# Patient Record
Sex: MALE | Race: BLACK OR AFRICAN AMERICAN | NOT HISPANIC OR LATINO | URBAN - METROPOLITAN AREA
[De-identification: names, ages, dates, MRNs, and addresses within clinical notes are randomized per-mention and may not be internally consistent; named-entity substitution may affect disease eponyms.]

---

## 2017-10-07 ENCOUNTER — EMERGENCY (EMERGENCY)
Facility: HOSPITAL | Age: 27
LOS: 1 days | Discharge: HOME | End: 2017-10-07
Attending: EMERGENCY MEDICINE | Admitting: EMERGENCY MEDICINE
Payer: MEDICAID

## 2017-10-07 VITALS
DIASTOLIC BLOOD PRESSURE: 82 MMHG | RESPIRATION RATE: 18 BRPM | OXYGEN SATURATION: 98 % | SYSTOLIC BLOOD PRESSURE: 138 MMHG | TEMPERATURE: 99 F | HEART RATE: 73 BPM

## 2017-10-07 DIAGNOSIS — J98.19 OTHER PULMONARY COLLAPSE: Chronic | ICD-10-CM

## 2017-10-07 PROCEDURE — 73610 X-RAY EXAM OF ANKLE: CPT | Mod: 26,LT

## 2017-10-07 PROCEDURE — 99284 EMERGENCY DEPT VISIT MOD MDM: CPT

## 2017-10-07 RX ORDER — CEPHALEXIN 500 MG
1 CAPSULE ORAL
Qty: 28 | Refills: 0 | OUTPATIENT
Start: 2017-10-07 | End: 2017-10-14

## 2017-10-07 NOTE — ED PROVIDER NOTE - PROGRESS NOTE DETAILS
SHAVONNE MCKEON:  Xray shows no foreign body.  Pt medically stable for discharge.  Pt to follow up with PMD or ED in 2-3 days for reassess.  Pt ambulatory without assistance.

## 2017-10-07 NOTE — ED PROVIDER NOTE - LOWER EXTREMITY EXAM, LEFT
healing abrasion over lateral malleolus, small bloody drainage, mild erythema around abrasion, swelling and TTP over lateral malleolus/SWELLING/TENDERNESS

## 2017-10-07 NOTE — ED PROVIDER NOTE - OBJECTIVE STATEMENT
25 y/o M with no significant PMHx of DM, HLD,, presents to the ED c/o left ankle pain. Pt fell appx one foot onto glass and scraped his foot 3 days ago. Pt states ankle is swollen, he believes he may have rolled his ankle when he fell. Pt endorses use of pain meds at home. Denies fevers, DC, pain in knee or hip, or any other complaints. NKDA. Allergy: shellfish.

## 2017-10-07 NOTE — ED PROVIDER NOTE - PMH
Asthma    Bleeding disorder  Unknown  Collapsed lung  right lung s/p surgical procedure  DM (diabetes mellitus)    HTN (hypertension)

## 2017-10-07 NOTE — ED PROVIDER NOTE - CARE PLAN
Principal Discharge DX:	Cellulitis of ankle  Instructions for follow-up, activity and diet:	Rest, drink plenty of fluids.  Advance activity as tolerated.  Continue all previously prescribed medications as directed.  Take Keflex 500 mg four times a day for 7 days -- finish this antibiotic. Keep extremity elevated and wrapped.  Apply cool compresses to affected area for 15 minutes, 3-4 times per day. Follow up with your primary care physician or ED in 48-72 hours- bring copies of your results.  Return to the ER for worsening or persistent symptoms, including but not limited to worsening redness, inability to walk, fevers, and/or ANY NEW OR CONCERNING SYMPTOMS. If you have issues obtaining follow up, please call: [phone] to obtain a doctor or specialist who takes your insurance in your area.

## 2017-10-07 NOTE — ED ADULT TRIAGE NOTE - CHIEF COMPLAINT QUOTE
Pt s/p fall 3 days ago, injured left ankle. Denies LOC during fall. c/o pain and swelling to left ankle. Hx of diabetes. FS is 251

## 2017-10-07 NOTE — ED PROVIDER NOTE - PLAN OF CARE
Rest, drink plenty of fluids.  Advance activity as tolerated.  Continue all previously prescribed medications as directed.  Take Keflex 500 mg four times a day for 7 days -- finish this antibiotic. Keep extremity elevated and wrapped.  Apply cool compresses to affected area for 15 minutes, 3-4 times per day. Follow up with your primary care physician or ED in 48-72 hours- bring copies of your results.  Return to the ER for worsening or persistent symptoms, including but not limited to worsening redness, inability to walk, fevers, and/or ANY NEW OR CONCERNING SYMPTOMS. If you have issues obtaining follow up, please call: [phone] to obtain a doctor or specialist who takes your insurance in your area.

## 2018-04-16 ENCOUNTER — EMERGENCY (EMERGENCY)
Facility: HOSPITAL | Age: 28
LOS: 1 days | Discharge: HOME | End: 2018-04-16
Admitting: EMERGENCY MEDICINE
Payer: MEDICAID

## 2018-04-16 VITALS
OXYGEN SATURATION: 100 % | DIASTOLIC BLOOD PRESSURE: 71 MMHG | RESPIRATION RATE: 16 BRPM | TEMPERATURE: 98 F | SYSTOLIC BLOOD PRESSURE: 142 MMHG | HEART RATE: 55 BPM

## 2018-04-16 DIAGNOSIS — J98.19 OTHER PULMONARY COLLAPSE: Chronic | ICD-10-CM

## 2018-04-16 PROCEDURE — 99284 EMERGENCY DEPT VISIT MOD MDM: CPT | Mod: 25
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
## 2018-04-17 PROCEDURE — 73610 X-RAY EXAM OF ANKLE: CPT | Mod: 26,RT

## 2018-04-17 PROCEDURE — 73562 X-RAY EXAM OF KNEE 3: CPT | Mod: 26,RT

## 2018-04-17 PROCEDURE — 73630 X-RAY EXAM OF FOOT: CPT | Mod: 26,RT

## 2018-04-17 RX ORDER — IBUPROFEN 200 MG
600 TABLET ORAL ONCE
Qty: 0 | Refills: 0 | Status: COMPLETED | OUTPATIENT
Start: 2018-04-17 | End: 2018-04-17

## 2018-04-17 RX ORDER — IPRATROPIUM/ALBUTEROL SULFATE 18-103MCG
3 AEROSOL WITH ADAPTER (GRAM) INHALATION ONCE
Qty: 0 | Refills: 0 | Status: COMPLETED | OUTPATIENT
Start: 2018-04-17 | End: 2018-04-17

## 2018-04-17 RX ADMIN — Medication 600 MILLIGRAM(S): at 02:35

## 2018-04-17 RX ADMIN — Medication 3 MILLILITER(S): at 02:35

## 2018-04-17 RX ADMIN — Medication 600 MILLIGRAM(S): at 01:44
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
## 2018-04-17 NOTE — ED PROVIDER NOTE - PROGRESS NOTE DETAILS
SHAVONNE Owens: received sign out from SHAVONNE Perez to follow up xrays and discharge home with bulky estrada dressing.  X-rays negative, bulky estrada dressing placed, discharge and results reviewed with patient.

## 2018-04-17 NOTE — ED PROVIDER NOTE - LOWER EXTREMITY EXAM, RIGHT
LIMITED ROM/TENDERNESS/R knee limited ROM secondary to pain. +Swelling, slight effusion. Point tenderness to the medial knee and over the patella. +2 DP pulses, <2s capillary refill. TTP and swelling to the lateral malleolus. Neurovascularly intact distally.

## 2018-04-17 NOTE — ED PROVIDER NOTE - CARE PLAN
Principal Discharge DX:	Knee pain  Assessment and plan of treatment:	Follow up with your Doctor in 1-2 days.  Follow up with Orthopedics in 1-2 days.(see attached list)  Rest. Ice to affected area 3-4 x a day.  Rush wrap to knee.  Use crutches to ambulate.  Take Ibuprofen 600mg orally every 8 hours as needed for pain take with food.  Return to the ER for any persistent/worsening or new symptoms weakness, numbness or any concerning symptoms.  Secondary Diagnosis:	Musculoskeletal pain

## 2018-04-17 NOTE — ED PROVIDER NOTE - PLAN OF CARE
Follow up with your Doctor in 1-2 days.  Follow up with Orthopedics in 1-2 days.(see attached list)  Rest. Ice to affected area 3-4 x a day.  Rush wrap to knee.  Use crutches to ambulate.  Take Ibuprofen 600mg orally every 8 hours as needed for pain take with food.  Return to the ER for any persistent/worsening or new symptoms weakness, numbness or any concerning symptoms.

## 2018-04-17 NOTE — ED PROVIDER NOTE - MEDICAL DECISION MAKING DETAILS
1. s/p injury. R/o fx. Will XR R knee and ankle.   2. Smoker, PMHx of asthma, diffuse wheezing on exam. Will give nebs and re-evaluate.

## 2018-05-18 ENCOUNTER — APPOINTMENT (OUTPATIENT)
Dept: ORTHOPEDIC SURGERY | Facility: CLINIC | Age: 28
End: 2018-05-18

## 2021-08-11 ENCOUNTER — EMERGENCY (EMERGENCY)
Facility: HOSPITAL | Age: 31
LOS: 1 days | Discharge: HOME | End: 2021-08-11
Attending: EMERGENCY MEDICINE | Admitting: EMERGENCY MEDICINE
Payer: MEDICAID

## 2021-08-11 ENCOUNTER — EMERGENCY (EMERGENCY)
Facility: HOSPITAL | Age: 31
LOS: 1 days | Discharge: HOME | End: 2021-08-11
Admitting: STUDENT IN AN ORGANIZED HEALTH CARE EDUCATION/TRAINING PROGRAM
Payer: MEDICAID

## 2021-08-11 VITALS
TEMPERATURE: 98 F | OXYGEN SATURATION: 97 % | HEART RATE: 51 BPM | RESPIRATION RATE: 20 BRPM | SYSTOLIC BLOOD PRESSURE: 148 MMHG | DIASTOLIC BLOOD PRESSURE: 94 MMHG

## 2021-08-11 VITALS
TEMPERATURE: 98 F | HEIGHT: 77 IN | RESPIRATION RATE: 16 BRPM | OXYGEN SATURATION: 97 % | HEART RATE: 77 BPM | SYSTOLIC BLOOD PRESSURE: 154 MMHG | DIASTOLIC BLOOD PRESSURE: 100 MMHG

## 2021-08-11 VITALS
HEART RATE: 60 BPM | SYSTOLIC BLOOD PRESSURE: 140 MMHG | DIASTOLIC BLOOD PRESSURE: 80 MMHG | HEIGHT: 77 IN | OXYGEN SATURATION: 100 % | RESPIRATION RATE: 16 BRPM | TEMPERATURE: 98 F

## 2021-08-11 DIAGNOSIS — J98.19 OTHER PULMONARY COLLAPSE: Chronic | ICD-10-CM

## 2021-08-11 DIAGNOSIS — N47.7 OTHER INFLAMMATORY DISEASES OF PREPUCE: ICD-10-CM

## 2021-08-11 PROBLEM — E11.9 TYPE 2 DIABETES MELLITUS WITHOUT COMPLICATIONS: Chronic | Status: ACTIVE | Noted: 2017-10-07

## 2021-08-11 PROCEDURE — 99282 EMERGENCY DEPT VISIT SF MDM: CPT

## 2021-08-11 PROCEDURE — 99284 EMERGENCY DEPT VISIT MOD MDM: CPT

## 2021-08-11 RX ORDER — LIDOCAINE 4 G/100G
15 CREAM TOPICAL ONCE
Refills: 0 | Status: DISCONTINUED | OUTPATIENT
Start: 2021-08-11 | End: 2021-08-11

## 2021-08-11 RX ORDER — OXYCODONE AND ACETAMINOPHEN 5; 325 MG/1; MG/1
1 TABLET ORAL ONCE
Refills: 0 | Status: DISCONTINUED | OUTPATIENT
Start: 2021-08-11 | End: 2021-08-11

## 2021-08-11 RX ORDER — LIDOCAINE 4 G/100G
1 CREAM TOPICAL
Qty: 30 | Refills: 0
Start: 2021-08-11 | End: 2021-08-17

## 2021-08-11 RX ORDER — LIDOCAINE HCL 20 MG/ML
15 VIAL (ML) INJECTION ONCE
Refills: 0 | Status: COMPLETED | OUTPATIENT
Start: 2021-08-11 | End: 2021-08-11

## 2021-08-11 RX ADMIN — Medication 15 MILLILITER(S): at 17:54

## 2021-08-11 RX ADMIN — Medication 1 APPLICATION(S): at 04:25

## 2021-08-11 RX ADMIN — OXYCODONE AND ACETAMINOPHEN 1 TABLET(S): 5; 325 TABLET ORAL at 17:52

## 2021-08-11 NOTE — ED PROVIDER NOTE - ATTENDING CONTRIBUTION TO CARE
I performed a face-to-face evaluation of the patient and performed a history and physical examination. I agree with the history and physical examination.    DM. Seen here earlier today for phimosis. Dribbling urine. Left to do an errand. Returns w/ phimosis. PE notable for clear discharge at glans, foreskin covering glans is dry, cracked, tender, unable to retract. Will notify Urology. Consider balanoposthitis c/b phimosis. Will check BG; pt likely needs better BG control.

## 2021-08-11 NOTE — ED PROVIDER NOTE - NSICDXPASTMEDICALHX_GEN_ALL_CORE_FT
PAST MEDICAL HISTORY:  Asthma     Bleeding disorder Unknown    Collapsed lung right lung s/p surgical procedure    DM (diabetes mellitus)     HTN (hypertension)

## 2021-08-11 NOTE — ED PROVIDER NOTE - OBJECTIVE STATEMENT
29 y/o male hx DM presents to ER c/o penile pain and foreskin swelling. Pt. states for the past year has been experiencing pain and tightness to his foreskin and is never able to fully retract it but states over the past week the pain and swelling has gotten much worse and noticed white discoloration and worsening raw/irritation to foreskin to the point where is burning constantly and he cannot retract at all. Pt. states hasn't been urinating as much bc it hurts/burns when he does. Denies fever chills nausea vomit weakness.

## 2021-08-11 NOTE — ED PROVIDER NOTE - PHYSICAL EXAMINATION
Gen: Well appearing in NAD  Head: NC/AT  Neck: trachea midline  Resp:  No distress  Ext: no deformities  Neuro:  A&O appears non focal  Skin:  Warm and dry as visualized  Psych:  Normal affect and mood     exam: Chaperone Marian Roper    + uncircumsized penis  + foreskin edema and inflammation - at foreskin meatus - inner lining/rim with irriation redness and white discoloration - unable to rectract due to pain.

## 2021-08-11 NOTE — ED ADULT TRIAGE NOTE - CHIEF COMPLAINT QUOTE
Pt c/o pain to penis, states foreskin to penis is irritated. Pt was treated here this morning and discharged, pt did not want to wait for urologist evaluation. Pt returning to ED due to pain to penis.

## 2021-08-11 NOTE — ED PROVIDER NOTE - CARE PROVIDER_API CALL
Fazal Elkins)  Urology  [address], [address]  [place] [address]  Phone: [phone]  Fax: [phone]  Follow Up Time: Urgent

## 2021-08-11 NOTE — ED PROVIDER NOTE - NSFOLLOWUPINSTRUCTIONS_ED_ALL_ED_FT
For pain control: wrap a 4x4 gauze bandage impregnated with lidocaine gel (prescription)  Apply triamcinolone cream to swollen foreskin twice daily.  Take oral antibiotics as prescribed.     You must follow up with urology in clinic AS SOON AS POSSIBLE. Please call Dr. Elkins's office tomorrow morning to make appointment.

## 2021-08-11 NOTE — ED ADULT TRIAGE NOTE - CHIEF COMPLAINT QUOTE
c/o "injury to penis" pt stating has recurring cut on foreskin, in pain. Unsure of how it happened. Denies problems with urination.

## 2021-08-11 NOTE — ED PROVIDER NOTE - OBJECTIVE STATEMENT
29 y/o male hx DM presents to ER c/o penile pain and foreskin swelling. Pt. states for the past year has been experiencing pain anf tightness to his foreskin and is never able to fully retract it but states over the past week the pain and swelling has gotten much worse and noticed white discoloration and worseing raw/irritation to foreskin to the point where is burning constantly and he cannot retract at all. Pt. states hasn't been urinating as much bc it hurts/burns when he does. Denies fever chills nausea vomit weakness.

## 2021-08-11 NOTE — ED PROVIDER NOTE - CLINICAL SUMMARY MEDICAL DECISION MAKING FREE TEXT BOX
Thelma: DM. Seen here earlier today for phimosis. Dribbling urine. Left to do an errand. Returns w/ phimosis. PE notable for clear discharge at glans, foreskin covering glans is dry, cracked, tender, unable to retract. Will notify Urology. Consider balanoposthitis c/b phimosis. Will check BG; pt likely needs better BG control.

## 2021-08-11 NOTE — ED PROVIDER NOTE - PROGRESS NOTE DETAILS
SHAVONNE East - patient states cannot states he cannot stay right now for urology - states he has to drop someone off at home ane will come abck - dc with clotrimazole  cream and strict return to ER precautions.

## 2021-08-11 NOTE — ED PROVIDER NOTE - NSFOLLOWUPINSTRUCTIONS_ED_ALL_ED_FT
REST, NO STRENUOUS ACTIVITY  USE ANTIFUNGAL CREAM AS DIRECTED  PLEASE RETURN TO THE ER AS SOON AS POSSIBLE FOR UROLOGY EVALUATION  RETURN TO ER FOR WORSENING SYMPTOMS

## 2021-08-11 NOTE — CONSULT NOTE ADULT - SUBJECTIVE AND OBJECTIVE BOX
HPI:  29 y/o male hx DM presents to ER c/o penile pain and foreskin swelling. Pt. states for the past year has been experiencing pain and  tightness to his foreskin and is never able to fully retract it but states over the past week the pain and swelling has gotten much worse and noticed white discoloration and worsening raw/irritation to foreskin to the point where is burning constantly and he cannot retract at all. Pt states that urinating worsens his penile / foreskin pain.   Denies fever chills nausea vomit weakness.    Pt reports having used some topical steroid and vaseline over past 2 weeks on his foreskin.  Additionally, he has been manipulating the area and trying to stretch it with his finger.     PAST MEDICAL & SURGICAL HISTORY:    HTN (hypertension)    Asthma    Collapsed lung  right lung s/p surgical procedure    Bleeding disorder  Unknown    DM (diabetes mellitus)    Collapsed lung        MEDICATIONS:    Denies      FAMILY HISTORY:    Denies    Allergies    No Known Drug Allergies  shellfish (Unknown)        SOCIAL HISTORY:  + smoker    REVIEW OF SYSTEMS: Otherwise negative as stated in HPI      Vital Signs Last 24 Hrs  T(C): 36.8 (11 Aug 2021 15:59), Max: 36.8 (11 Aug 2021 15:59)  T(F): 98.3 (11 Aug 2021 15:59), Max: 98.3 (11 Aug 2021 15:59)  HR: 60 (11 Aug 2021 15:59) (60 - 77)  BP: 140/80 (11 Aug 2021 15:59) (140/80 - 154/100)  BP(mean): --  RR: 16 (11 Aug 2021 15:59) (16 - 16)  SpO2: 100% (11 Aug 2021 15:59) (97% - 100%)    PHYSICAL EXAM:    General:[x ] Awake and Alert in no acute distress    Respiratory and Thorax: [ x ] no resp distress   	  Cardiovascular:  [x] Reg Rate    Gastrointestinal: [x ]soft  [x ] non tender     Genitourinary: Glans  + uncircumcised penis  + foreskin edema and inflammation - at foreskin meatus - inner lining/rim with irritation redness and white discoloration        - unable to retract  2/2 pain                              	          LABS:     POCT Glucose =415

## 2021-08-11 NOTE — ED PROVIDER NOTE - PATIENT PORTAL LINK FT
You can access the FollowMyHealth Patient Portal offered by [facility] by registering at the following website: [email or web address]. By joining [employer]’s FollowMyHealth portal, you will also be able to view your health information using other applications (apps) compatible with our system.

## 2021-08-11 NOTE — ED PROVIDER NOTE - CARE PLAN
1 Principal Discharge DX:	Phimosis   Principal Discharge DX:	Phimosis  Secondary Diagnosis:	Balanitis

## 2021-08-11 NOTE — ED PROVIDER NOTE - ATTENDING CONTRIBUTION TO CARE
30M h/o DM p/w penile pain and foreskin swelling. States for the past week the pain and swelling has gotten much worse and noticed white discoloration and worseing irritation to foreskin. Reports burning with urination. Denies fever chills nausea vomit weakness. No history of STIs. Sexually active one partner  Gen: uncomfortable but nad  CV: rrr, no murmur  Pulm: clear lungs  Abd: obese, soft, ntnd  : (performed with SHAVONNE East) uncircumsized penis with foreskin edema and inflammation, irritation and redness with white discoloration/discharge at meatus/distal end of foreskin; unable to retract due to pain  MDM: 30M h/o DM p/w penile pain and foreskin swelling and pain and +white discharge with irritation c/w phimosis and likely underlying fungal infection - recommended uro consult but pt stated that he could not wait and would return; will give anti-fungal cream now, strict return precautions provided and pt states he will return to ED once he drops his gilfriend who is waiting outside off at home

## 2021-08-11 NOTE — ED PROVIDER NOTE - CLINICAL SUMMARY MEDICAL DECISION MAKING FREE TEXT BOX
29 y/o male hx DM w/ worsening foreskin swelling and pain  -phimosis w/ underlying infection - liekly fungal   -will need urology consult  -patient states cannot stay right now but will come back later in the morning

## 2021-08-11 NOTE — ED PROVIDER NOTE - NSFOLLOWUPCLINICS_GEN_ALL_ED_FT
[facility]  Endocrinology  [address] [place] [address]  Phone: [phone]  Fax:   Follow Up Time: 1-3 Days    [facility]  Endocrinology  [address] [place] [address]  Phone: [phone]  Fax: [phone]  Follow Up Time: 1-3 Days

## 2021-08-11 NOTE — CONSULT NOTE ADULT - PROBLEM SELECTOR RECOMMENDATION 9
Would start Triamcinolone cream BID for 2-3 weeks   Can also give Lidocaine cream / jelly for discomfort  Stop manipulating area (attempting to stretch or dilate)  No sexual activity  Follow up with Dr Elkins in 7-10 days for reevaluation and planning for circumcision  Recommend promptly seeing Endocrinologist to better manage your diabetes  D/W Dr. Elkins    [phone]

## 2021-08-11 NOTE — ED ADULT NURSE NOTE - OBJECTIVE STATEMENT
Pt. A&Ox3, states his foreskin is stuck since yesterday. Pt. was seen overnight and decided to leave because he had someone with him. Meds given for pain as ordered by MD. Will continue to monitor.

## 2021-08-28 NOTE — ED PROVIDER NOTE - TOBACCO USE
Problem: Falls - Risk of:  Goal: Will remain free from falls  Description: Will remain free from falls  8/27/2021 2216 by Yonis Luna RN  Outcome: Ongoing     Problem: Falls - Risk of:  Goal: Absence of physical injury  Description: Absence of physical injury  8/27/2021 2216 by Yonis Luna RN  Outcome: Ongoing Current every day smoker

## 2021-10-20 ENCOUNTER — EMERGENCY (EMERGENCY)
Facility: HOSPITAL | Age: 31
LOS: 1 days | Discharge: HOME | End: 2021-10-20
Attending: HOSPITALIST | Admitting: HOSPITALIST
Payer: MEDICAID

## 2021-10-20 VITALS
HEIGHT: 77 IN | TEMPERATURE: 98 F | SYSTOLIC BLOOD PRESSURE: 112 MMHG | HEART RATE: 55 BPM | DIASTOLIC BLOOD PRESSURE: 57 MMHG | OXYGEN SATURATION: 100 % | RESPIRATION RATE: 16 BRPM

## 2021-10-20 DIAGNOSIS — J98.19 OTHER PULMONARY COLLAPSE: Chronic | ICD-10-CM

## 2021-10-20 PROCEDURE — 99283 EMERGENCY DEPT VISIT LOW MDM: CPT

## 2021-10-20 NOTE — ED ADULT TRIAGE NOTE - CHIEF COMPLAINT QUOTE
Pt. c/o torn foreskin of penis. stated it happened 1 month ago and he reinjured it.  noted with some bleeding. Hx. DM HR 55. Pt. with hx of low HR. denies any cp.

## 2021-10-21 RX ORDER — FLUCONAZOLE 150 MG/1
150 TABLET ORAL ONCE
Refills: 0 | Status: COMPLETED | OUTPATIENT
Start: 2021-10-21 | End: 2021-10-21

## 2021-10-21 RX ORDER — METRONIDAZOLE 500 MG
1 TABLET ORAL
Qty: 14 | Refills: 0
Start: 2021-10-21 | End: 2021-10-27

## 2021-10-21 RX ORDER — METRONIDAZOLE 500 MG
500 TABLET ORAL ONCE
Refills: 0 | Status: COMPLETED | OUTPATIENT
Start: 2021-10-21 | End: 2021-10-21

## 2021-10-21 RX ORDER — IBUPROFEN 200 MG
600 TABLET ORAL ONCE
Refills: 0 | Status: COMPLETED | OUTPATIENT
Start: 2021-10-21 | End: 2021-10-21

## 2021-10-21 RX ORDER — FLUCONAZOLE 150 MG/1
1 TABLET ORAL
Qty: 1 | Refills: 0
Start: 2021-10-21 | End: 2021-10-21

## 2021-10-21 RX ORDER — HYDROCORTISONE 1 %
1 OINTMENT (GRAM) TOPICAL
Qty: 30 | Refills: 0
Start: 2021-10-21 | End: 2021-10-27

## 2021-10-21 NOTE — ED PROVIDER NOTE - NSFOLLOWUPCLINICS_GEN_ALL_ED_FT
[facility] - Urology  Urology  [address] [place] [address]  Phone: [phone]  Fax:   Follow Up Time: 1-3 Days

## 2021-10-21 NOTE — ED PROVIDER NOTE - PATIENT PORTAL LINK FT
You can access the FollowMyHealth Patient Portal offered by [facility] by registering at the following website: [email or web address]. By joining [employer]’s FollowMyHealth portal, you will also be able to view your health information using other applications (apps) compatible with our system.

## 2021-10-21 NOTE — ED PROVIDER NOTE - PENIS
swelling/thickening of foreskin, can retract somewhat. white discharge under foreskin. +ttp./uncircumcised/PHIMOSIS

## 2021-10-21 NOTE — ED PROVIDER NOTE - CLINICAL SUMMARY MEDICAL DECISION MAKING FREE TEXT BOX
31M with balanitis. flagyl, fluconazole here. will prescribe antifungal and steroid ointments. outpt f/u with Urology. 31M with balanitis. will prescribe flagyl, fluconazole and antifungal and steroid ointments. outpt f/u with Urology.

## 2021-10-21 NOTE — ED PROVIDER NOTE - OBJECTIVE STATEMENT
31M with hx of DM p/w penile pain. patient with hx of balanitis in the past, treated but recurs. states that he has been having pain for about 1 week. no dysuria or fevers. urinating w/o problem.

## 2021-11-22 ENCOUNTER — EMERGENCY (EMERGENCY)
Facility: HOSPITAL | Age: 31
LOS: 0 days | Discharge: HOME | End: 2021-11-23
Attending: EMERGENCY MEDICINE
Payer: MEDICAID

## 2021-11-22 VITALS
TEMPERATURE: 98 F | DIASTOLIC BLOOD PRESSURE: 83 MMHG | HEIGHT: 77 IN | HEART RATE: 66 BPM | OXYGEN SATURATION: 99 % | SYSTOLIC BLOOD PRESSURE: 144 MMHG | RESPIRATION RATE: 17 BRPM | WEIGHT: 279.99 LBS

## 2021-11-22 DIAGNOSIS — J45.909 UNSPECIFIED ASTHMA, UNCOMPLICATED: ICD-10-CM

## 2021-11-22 DIAGNOSIS — I10 ESSENTIAL (PRIMARY) HYPERTENSION: ICD-10-CM

## 2021-11-22 DIAGNOSIS — E11.9 TYPE 2 DIABETES MELLITUS WITHOUT COMPLICATIONS: ICD-10-CM

## 2021-11-22 DIAGNOSIS — N47.1 PHIMOSIS: ICD-10-CM

## 2021-11-22 DIAGNOSIS — Z91.013 ALLERGY TO SEAFOOD: ICD-10-CM

## 2021-11-22 DIAGNOSIS — R10.9 UNSPECIFIED ABDOMINAL PAIN: ICD-10-CM

## 2021-11-22 DIAGNOSIS — J98.19 OTHER PULMONARY COLLAPSE: Chronic | ICD-10-CM

## 2021-11-22 PROCEDURE — 99283 EMERGENCY DEPT VISIT LOW MDM: CPT

## 2021-11-22 NOTE — ED ADULT TRIAGE NOTE - CHIEF COMPLAINT QUOTE
pt a&O x4, pt c.o of abd pain cental abd pain x 2 weeks. pt also states " my foreskin around penis is tight and Is tearing and it hurts".   No n/v/d.  PMH DM.

## 2021-11-23 VITALS
HEART RATE: 72 BPM | SYSTOLIC BLOOD PRESSURE: 137 MMHG | RESPIRATION RATE: 17 BRPM | OXYGEN SATURATION: 100 % | TEMPERATURE: 99 F | DIASTOLIC BLOOD PRESSURE: 86 MMHG

## 2021-11-23 RX ORDER — LIDOCAINE 4 G/100G
1 CREAM TOPICAL
Qty: 30 | Refills: 0
Start: 2021-11-23 | End: 2021-11-29

## 2021-11-23 NOTE — ED PROVIDER NOTE - NSICDXPASTSURGICALHX_GEN_ALL_CORE_FT
Pt sent in for psych evaluation. Denies suicidal/homicidal ideations. Pt has been refusing all medications
PAST SURGICAL HISTORY:  Collapsed lung

## 2021-11-23 NOTE — ED PROVIDER NOTE - CLINICAL SUMMARY MEDICAL DECISION MAKING FREE TEXT BOX
DDx- phimosis, no evidence of paraphimosis, no evidence of balanitis  Plan- urology consult, likely d/c with follow up

## 2021-11-23 NOTE — ED PROVIDER NOTE - PATIENT PORTAL LINK FT
You can access the FollowMyHealth Patient Portal offered by [facility] by registering at the following website: [email or web address]. By joining [employer]’s FollowMyHealth portal, you will also be able to view your health information using other applications (apps) compatible with our system.

## 2021-11-23 NOTE — ED PROVIDER NOTE - CARE PROVIDER_API CALL
Sisi Benites)  Urology  [address] [place] [address]  Phone: [phone]  Fax: [phone]  Follow Up Time: 1-3 Days

## 2021-11-23 NOTE — ED PROVIDER NOTE - PROGRESS NOTE DETAILS
Pt is comfortable, sleeping. Has his urologist for f/u. Referred to Urology affiliated with [facility] as well. Pt given return precautions.

## 2021-11-23 NOTE — ED PROVIDER NOTE - OBJECTIVE STATEMENT
31 year old M w/PMHx of phimosis, balanitis and paraphimosis and presenting for inability to retract foreskin for last skin. Pt was diagnosed with paraphimosis in September. Pt was to follow up with urology but skipped circumcision appointment Denies fever/chills, cough, headache, abdominal pain, back pain or dysuria

## 2021-11-23 NOTE — ED PROVIDER NOTE - PENIS
unable to fully retract foreskin, able to uncover urethra/uncircumcised unable to fully retract foreskin, able to uncover urethra and part of the glans/uncircumcised

## 2022-11-10 NOTE — ED PROVIDER NOTE - MEDICAL DECISION MAKING DETAILS
121
25 y/o M with likely early cellulitis on left ankle. Will r/o foreign body and Fx by obtaining XR. Will give Abx.